# Patient Record
Sex: FEMALE | Race: WHITE | NOT HISPANIC OR LATINO | Employment: FULL TIME | URBAN - METROPOLITAN AREA
[De-identification: names, ages, dates, MRNs, and addresses within clinical notes are randomized per-mention and may not be internally consistent; named-entity substitution may affect disease eponyms.]

---

## 2020-10-20 ENCOUNTER — OFFICE VISIT (OUTPATIENT)
Dept: OBSTETRICS AND GYNECOLOGY | Facility: CLINIC | Age: 50
End: 2020-10-20
Payer: COMMERCIAL

## 2020-10-20 VITALS
DIASTOLIC BLOOD PRESSURE: 73 MMHG | HEIGHT: 61 IN | WEIGHT: 146 LBS | BODY MASS INDEX: 27.56 KG/M2 | SYSTOLIC BLOOD PRESSURE: 110 MMHG

## 2020-10-20 DIAGNOSIS — N95.1 MENOPAUSE SYNDROME: Primary | ICD-10-CM

## 2020-10-20 DIAGNOSIS — Z12.39 ENCOUNTER FOR SCREENING FOR MALIGNANT NEOPLASM OF BREAST, UNSPECIFIED SCREENING MODALITY: ICD-10-CM

## 2020-10-20 DIAGNOSIS — R10.2 PELVIC PAIN: ICD-10-CM

## 2020-10-20 PROCEDURE — 99396 PREV VISIT EST AGE 40-64: CPT | Mod: S$GLB,,, | Performed by: OBSTETRICS & GYNECOLOGY

## 2020-10-20 PROCEDURE — 3008F BODY MASS INDEX DOCD: CPT | Mod: CPTII,S$GLB,, | Performed by: OBSTETRICS & GYNECOLOGY

## 2020-10-20 PROCEDURE — 3008F PR BODY MASS INDEX (BMI) DOCUMENTED: ICD-10-PCS | Mod: CPTII,S$GLB,, | Performed by: OBSTETRICS & GYNECOLOGY

## 2020-10-20 PROCEDURE — 99396 PR PREVENTIVE VISIT,EST,40-64: ICD-10-PCS | Mod: S$GLB,,, | Performed by: OBSTETRICS & GYNECOLOGY

## 2020-10-20 RX ORDER — LEVOTHYROXINE SODIUM 100 UG/1
TABLET ORAL
COMMUNITY
Start: 2020-08-14 | End: 2021-12-17

## 2020-10-20 RX ORDER — SUMATRIPTAN SUCCINATE 100 MG/1
TABLET ORAL
COMMUNITY
Start: 2020-09-09 | End: 2021-12-17

## 2020-10-20 RX ORDER — NORETHINDRONE AND ETHINYL ESTRADIOL AND FERROUS FUMARATE 0.8-25(24)
1 KIT ORAL DAILY
COMMUNITY
Start: 2020-08-03 | End: 2020-10-20

## 2020-10-20 RX ORDER — HYDROCHLOROTHIAZIDE 25 MG/1
25 TABLET ORAL DAILY
COMMUNITY
Start: 2020-08-15 | End: 2023-09-01

## 2020-10-20 RX ORDER — DEXTROAMPHETAMINE SACCHARATE, AMPHETAMINE ASPARTATE, DEXTROAMPHETAMINE SULFATE AND AMPHETAMINE SULFATE 7.5; 7.5; 7.5; 7.5 MG/1; MG/1; MG/1; MG/1
TABLET ORAL
COMMUNITY
Start: 2020-09-09 | End: 2023-09-01

## 2020-10-20 NOTE — PROGRESS NOTES
Subjective:       Patient ID: Tereso Rogers is a 49 y.o. female.    Chief Complaint:  Well Woman      History of Present Illness  HPI  Annual Exam-Premenopausal  Patient presents for annual exam. The patient has no complaints today. The patient is sexually active. GYN screening history: last pap: was normal.                     GYN & OB History  No LMP recorded.   Date of Last Pap: No result found    OB History    Para Term  AB Living   3 3 3         SAB TAB Ectopic Multiple Live Births                  # Outcome Date GA Lbr Beni/2nd Weight Sex Delivery Anes PTL Lv   3 Term      Vag-Spont      2 Term      Vag-Spont      1 Term      Vag-Spont          Review of Systems  Review of Systems   Constitutional: Negative.  Negative for activity change, appetite change, chills, fatigue, fever and unexpected weight change.   HENT: Negative for nasal congestion.    Eyes: Negative for visual disturbance.   Respiratory: Negative for cough, shortness of breath and wheezing.    Cardiovascular: Negative for chest pain, palpitations and leg swelling.   Gastrointestinal: Negative.  Negative for abdominal pain, bloating, blood in stool, constipation, diarrhea and reflux.   Endocrine: Negative for diabetes, hair loss, hot flashes, hyperthyroidism and hypothyroidism.   Genitourinary: Positive for dyspareunia, hot flashes and pelvic pain. Negative for bladder incontinence, decreased libido, dysmenorrhea, dysuria, flank pain, frequency, genital sores, hematuria, menorrhagia, menstrual problem, urgency, vaginal bleeding, vaginal discharge, vaginal pain, urinary incontinence, postcoital bleeding, postmenopausal bleeding, vaginal dryness and vaginal odor.   Musculoskeletal: Negative for back pain, joint swelling and myalgias.   Integumentary:  Negative for rash, acne, hair changes, mole/lesion, breast mass, nipple discharge, breast skin changes and breast tenderness. Negative.   Neurological: Negative.  Negative for  vertigo, seizures, syncope, numbness and headaches.   Hematological: Negative.  Negative for adenopathy. Does not bruise/bleed easily.   Psychiatric/Behavioral: Negative.  Negative for depression and sleep disturbance. The patient is not nervous/anxious.    Breast: negative.  Negative for asymmetry, breast self exam, lump, mass, mastodynia, nipple discharge, skin changes and tenderness          Objective:    Physical Exam:   Constitutional: She appears well-developed and well-nourished.    HENT:   Nose: No epistaxis.     Neck: No thyroid mass and no thyromegaly present.    Cardiovascular: Normal rate, regular rhythm and normal pulses.     Pulmonary/Chest: Effort normal and breath sounds normal. Chest wall is not dull to percussion. She exhibits no mass, no tenderness, no bony tenderness, no laceration, no crepitus, no edema, no deformity, no swelling and no retraction. Right breast exhibits no inverted nipple, no mass, no nipple discharge, no skin change, no tenderness, presence, no bleeding and no swelling. Left breast exhibits no inverted nipple, no mass, no nipple discharge, no skin change, no tenderness, presence, no bleeding and no swelling.        Abdominal: Soft. Normal appearance and bowel sounds are normal. There is no abdominal tenderness. Hernia confirmed negative in the right inguinal area and confirmed negative in the left inguinal area.     Genitourinary:    Vagina normal.      Pelvic exam was performed with patient supine.   There is no rash, tenderness, lesion or injury on the right labia. There is no rash, tenderness, lesion or injury on the left labia. Uterus is tender. Cervix is normal. Right adnexum displays no mass, no tenderness and no fullness. Left adnexum displays no mass, no tenderness and no fullness. No rectocele, cystocele or unspecified prolapse of vaginal walls in the vagina. Labial bartholins normal.   Genitourinary Comments: Tender adnexa left                   Skin: Skin is warm and  dry.    Psychiatric: She has a normal mood and affect. Her speech is normal and behavior is normal.          Assessment:        1. Menopause syndrome    2. Pelvic pain    3. Encounter for screening for malignant neoplasm of breast, unspecified screening modality       Menopause syndrome  -     Follicle Stimulating Hormone; Future; Expected date: 10/20/2020  -     Testosterone; Future; Expected date: 10/20/2020    Pelvic pain  -     US OB/GYN Procedure ([facility]); Future    Encounter for screening for malignant neoplasm of breast, unspecified screening modality  -     Mammo Digital Screening Bilat w/ Hugo; Future; Expected date: 10/20/2020             Plan:      Follow up in about 1 year (around 10/20/2021).    t she had blood work drawn while she was on the pill which I explained is not accurate she has stopped it for 2 months therefore we will recheck her labs because she is having menopausal symptoms.  We also get an ultrasound to look at the cause of the pain on that side on the left

## 2020-10-22 ENCOUNTER — PATIENT MESSAGE (OUTPATIENT)
Dept: OBSTETRICS AND GYNECOLOGY | Facility: CLINIC | Age: 50
End: 2020-10-22

## 2020-10-22 ENCOUNTER — PROCEDURE VISIT (OUTPATIENT)
Dept: OBSTETRICS AND GYNECOLOGY | Facility: CLINIC | Age: 50
End: 2020-10-22
Payer: COMMERCIAL

## 2020-10-22 DIAGNOSIS — R10.2 PELVIC PAIN: ICD-10-CM

## 2020-10-22 PROCEDURE — 76830 PR  ECHOGRAPHY,TRANSVAGINAL: ICD-10-PCS | Mod: S$GLB,,, | Performed by: OBSTETRICS & GYNECOLOGY

## 2020-10-22 PROCEDURE — 76830 TRANSVAGINAL US NON-OB: CPT | Mod: S$GLB,,, | Performed by: OBSTETRICS & GYNECOLOGY

## 2020-10-22 NOTE — TELEPHONE ENCOUNTER
The patient and discussed her ultrasound results which showed that she had some fibroids and otherwise looked okay she does not have significant pain from this and does not desire any surgery or definitive treatment.  We will wait for her labs to see if she is currently menopausal

## 2020-11-03 ENCOUNTER — PATIENT MESSAGE (OUTPATIENT)
Dept: OBSTETRICS AND GYNECOLOGY | Facility: CLINIC | Age: 50
End: 2020-11-03

## 2020-11-03 DIAGNOSIS — N95.1 MENOPAUSE SYNDROME: Primary | ICD-10-CM

## 2020-11-04 ENCOUNTER — TELEPHONE (OUTPATIENT)
Dept: OBSTETRICS AND GYNECOLOGY | Facility: CLINIC | Age: 50
End: 2020-11-04

## 2020-11-04 RX ORDER — DROSPIRENONE AND ETHINYL ESTRADIOL 0.02-3(28)
1 KIT ORAL DAILY
Qty: 28 TABLET | Refills: 12 | Status: SHIPPED | OUTPATIENT
Start: 2020-11-04 | End: 2021-12-17

## 2020-11-04 NOTE — TELEPHONE ENCOUNTER
----- Message from Rasheeda Zaidi sent at 11/3/2020  2:31 PM CST -----  Regarding: test results  Contact: Pt  Type:  Test Results    Who Called: Tereso Rogers  Name of Test (Lab/Mammo/Etc): lab   Date of Test: 10/20/20  Ordering Provider: Solo  Where the test was performed:  [facility]   Would the patient rather a call back or a response via [facility]? Call back   Best Call Back Number: [phone]  Additional Information:

## 2021-01-19 ENCOUNTER — PATIENT MESSAGE (OUTPATIENT)
Dept: OBSTETRICS AND GYNECOLOGY | Facility: CLINIC | Age: 51
End: 2021-01-19

## 2021-06-29 ENCOUNTER — TELEPHONE (OUTPATIENT)
Dept: OBSTETRICS AND GYNECOLOGY | Facility: CLINIC | Age: 51
End: 2021-06-29

## 2021-06-29 ENCOUNTER — PATIENT MESSAGE (OUTPATIENT)
Dept: OBSTETRICS AND GYNECOLOGY | Facility: CLINIC | Age: 51
End: 2021-06-29

## 2021-07-20 ENCOUNTER — PATIENT MESSAGE (OUTPATIENT)
Dept: OBSTETRICS AND GYNECOLOGY | Facility: CLINIC | Age: 51
End: 2021-07-20

## 2021-07-23 ENCOUNTER — TELEPHONE (OUTPATIENT)
Dept: OBSTETRICS AND GYNECOLOGY | Facility: CLINIC | Age: 51
End: 2021-07-23

## 2021-12-17 ENCOUNTER — OFFICE VISIT (OUTPATIENT)
Dept: OBSTETRICS AND GYNECOLOGY | Facility: CLINIC | Age: 51
End: 2021-12-17
Payer: COMMERCIAL

## 2021-12-17 VITALS
DIASTOLIC BLOOD PRESSURE: 90 MMHG | BODY MASS INDEX: 27.19 KG/M2 | SYSTOLIC BLOOD PRESSURE: 125 MMHG | HEIGHT: 61 IN | WEIGHT: 144 LBS

## 2021-12-17 DIAGNOSIS — Z30.9 ENCOUNTER FOR CONTRACEPTIVE MANAGEMENT, UNSPECIFIED TYPE: ICD-10-CM

## 2021-12-17 DIAGNOSIS — N95.1 MENOPAUSE SYNDROME: ICD-10-CM

## 2021-12-17 DIAGNOSIS — Z12.39 ENCOUNTER FOR SCREENING FOR MALIGNANT NEOPLASM OF BREAST, UNSPECIFIED SCREENING MODALITY: ICD-10-CM

## 2021-12-17 DIAGNOSIS — Z00.00 ENCOUNTER FOR WELLNESS EXAMINATION: Primary | ICD-10-CM

## 2021-12-17 PROCEDURE — 99396 PR PREVENTIVE VISIT,EST,40-64: ICD-10-PCS | Mod: S$GLB,,, | Performed by: OBSTETRICS & GYNECOLOGY

## 2021-12-17 PROCEDURE — 99396 PREV VISIT EST AGE 40-64: CPT | Mod: S$GLB,,, | Performed by: OBSTETRICS & GYNECOLOGY

## 2021-12-17 RX ORDER — LEVOTHYROXINE SODIUM 112 UG/1
TABLET ORAL
COMMUNITY
Start: 2021-11-26

## 2021-12-17 RX ORDER — DROSPIRENONE AND ETHINYL ESTRADIOL 0.02-3(28)
1 KIT ORAL DAILY
Qty: 84 TABLET | Refills: 3 | Status: SHIPPED | OUTPATIENT
Start: 2021-12-17 | End: 2022-12-23

## 2022-01-25 ENCOUNTER — PATIENT MESSAGE (OUTPATIENT)
Dept: OBSTETRICS AND GYNECOLOGY | Facility: CLINIC | Age: 52
End: 2022-01-25
Payer: COMMERCIAL

## 2022-07-14 ENCOUNTER — TELEPHONE (OUTPATIENT)
Dept: OBSTETRICS AND GYNECOLOGY | Facility: CLINIC | Age: 52
End: 2022-07-14
Payer: COMMERCIAL

## 2022-07-14 NOTE — TELEPHONE ENCOUNTER
----- Message from Kenia Mares sent at 7/14/2022 11:26 AM CDT -----  Contact: [facility]- radiology      Who Called: [facility]- radiology      Does the patient know what this is regarding?: need update mammo order/ pt scheduled 07/15/2022- 720 am     Would the patient rather a call back or a response via [facility]?  Callback   Best Call Back Number: [phone]    Additional Information: fax # [phone]

## 2022-07-21 ENCOUNTER — PATIENT MESSAGE (OUTPATIENT)
Dept: OBSTETRICS AND GYNECOLOGY | Facility: CLINIC | Age: 52
End: 2022-07-21
Payer: COMMERCIAL

## 2022-12-22 ENCOUNTER — TELEPHONE (OUTPATIENT)
Dept: OBSTETRICS AND GYNECOLOGY | Facility: CLINIC | Age: 52
End: 2022-12-22
Payer: COMMERCIAL

## 2022-12-22 NOTE — TELEPHONE ENCOUNTER
Spoke with pt. She is going to keep appt for tomorrow. If she needs to cancel in the morning she will call.

## 2022-12-22 NOTE — TELEPHONE ENCOUNTER
----- Message from Katerina Sultana sent at 12/22/2022  2:05 PM CST -----  Contact: self  Patient called about her appointment for tomorrow please call [phone]

## 2022-12-23 ENCOUNTER — PATIENT MESSAGE (OUTPATIENT)
Dept: OBSTETRICS AND GYNECOLOGY | Facility: CLINIC | Age: 52
End: 2022-12-23

## 2022-12-23 ENCOUNTER — OFFICE VISIT (OUTPATIENT)
Dept: OBSTETRICS AND GYNECOLOGY | Facility: CLINIC | Age: 52
End: 2022-12-23
Payer: COMMERCIAL

## 2022-12-23 VITALS
DIASTOLIC BLOOD PRESSURE: 93 MMHG | HEART RATE: 87 BPM | SYSTOLIC BLOOD PRESSURE: 129 MMHG | BODY MASS INDEX: 27.56 KG/M2 | HEIGHT: 61 IN | WEIGHT: 146 LBS

## 2022-12-23 DIAGNOSIS — Z13.820 SCREENING FOR OSTEOPOROSIS: ICD-10-CM

## 2022-12-23 DIAGNOSIS — N95.1 MENOPAUSE SYNDROME: ICD-10-CM

## 2022-12-23 DIAGNOSIS — Z00.00 BLOOD TESTS FOR ROUTINE GENERAL PHYSICAL EXAMINATION: ICD-10-CM

## 2022-12-23 DIAGNOSIS — Z12.31 ENCOUNTER FOR SCREENING MAMMOGRAM FOR MALIGNANT NEOPLASM OF BREAST: ICD-10-CM

## 2022-12-23 DIAGNOSIS — N95.1 MENOPAUSE SYNDROME: Primary | ICD-10-CM

## 2022-12-23 DIAGNOSIS — Z01.419 GYNECOLOGIC EXAM NORMAL: Primary | ICD-10-CM

## 2022-12-23 LAB
ABS NRBC COUNT: 0 X 10 3/UL (ref 0–0.01)
ABSOLUTE BASOPHIL: 0.06 X 10 3/UL (ref 0–0.22)
ABSOLUTE EOSINOPHIL: 0.18 X 10 3/UL (ref 0.04–0.54)
ABSOLUTE IMMATURE GRAN: 0.02 X 10 3/UL (ref 0–0.04)
ABSOLUTE LYMPHOCYTE: 3.18 X 10 3/UL (ref 0.86–4.75)
ABSOLUTE MONOCYTE: 0.71 X 10 3/UL (ref 0.22–1.08)
ALBUMIN SERPL-MCNC: 4.4 G/DL (ref 3.5–5.2)
ALBUMIN/GLOB SERPL ELPH: 1.5 {RATIO} (ref 1–2.7)
ALP ISOS SERPL LEV INH-CCNC: 77 U/L (ref 35–105)
ALT (SGPT): 17 U/L (ref 0–33)
ANION GAP SERPL CALC-SCNC: 13 MMOL/L (ref 8–17)
AST SERPL-CCNC: 16 U/L (ref 0–32)
BASOPHILS NFR BLD: 0.8 % (ref 0.2–1.2)
BILIRUBIN, TOTAL: 0.48 MG/DL (ref 0–1.2)
BUN/CREAT SERPL: 20.7 (ref 6–20)
CALCIUM SERPL-MCNC: 10.2 MG/DL (ref 8.6–10.2)
CARBON DIOXIDE, CO2: 27 MMOL/L (ref 22–29)
CHLORIDE: 103 MMOL/L (ref 98–107)
CHOLEST SERPL-MSCNC: 226 MG/DL (ref 100–200)
CREAT SERPL-MCNC: 0.92 MG/DL (ref 0.5–0.9)
EOSINOPHIL NFR BLD: 2.3 % (ref 0.7–7)
FSH: 79.4 MIU/ML
GFR ESTIMATION: 75.86
GLOBULIN: 3 G/DL (ref 1.5–4.5)
GLUCOSE: 99 MG/DL (ref 74–106)
HCT VFR BLD AUTO: 47.9 % (ref 37–47)
HDLC SERPL-MCNC: 70 MG/DL
HGB BLD-MCNC: 16.3 G/DL (ref 12–16)
IMMATURE GRANULOCYTES: 0.3 % (ref 0–0.5)
LDL/HDL RATIO: 2 (ref 1–3)
LDLC SERPL CALC-MCNC: 137.8 MG/DL (ref 0–100)
LYMPHOCYTES NFR BLD: 41 % (ref 19.3–53.1)
MCH RBC QN AUTO: 30 PG (ref 27–32)
MCHC RBC AUTO-ENTMCNC: 34 G/DL (ref 32–36)
MCV RBC AUTO: 88.2 FL (ref 82–100)
MONOCYTES NFR BLD: 9.1 % (ref 4.7–12.5)
NEUTROPHILS # BLD AUTO: 3.61 X 10 3/UL (ref 2.15–7.56)
NEUTROPHILS NFR BLD: 46.5 % (ref 34–71.1)
NUCLEATED RED BLOOD CELLS: 0 /100 WBC (ref 0–0.2)
PLATELET # BLD AUTO: 312 X 10 3/UL (ref 135–400)
POTASSIUM: 3.2 MMOL/L (ref 3.5–5.1)
PROT SNV-MCNC: 7.4 G/DL (ref 6.4–8.3)
RBC # BLD AUTO: 5.43 X 10 6/UL (ref 4.2–5.4)
RDW-SD: 41.3 FL (ref 37–54)
SODIUM: 143 MMOL/L (ref 136–145)
TRIGL SERPL-MCNC: 91 MG/DL (ref 0–150)
TSH SERPL DL<=0.005 MIU/L-ACNC: 0.57 UIU/ML (ref 0.27–4.2)
UREA NITROGEN (BUN): 19 MG/DL (ref 6–20)
WBC # BLD: 7.76 X 10 3/UL (ref 4.3–10.8)

## 2022-12-23 PROCEDURE — 99396 PREV VISIT EST AGE 40-64: CPT | Mod: S$GLB,,, | Performed by: NURSE PRACTITIONER

## 2022-12-23 PROCEDURE — 99396 PR PREVENTIVE VISIT,EST,40-64: ICD-10-PCS | Mod: S$GLB,,, | Performed by: NURSE PRACTITIONER

## 2022-12-23 RX ORDER — TOPIRAMATE 100 MG/1
100 TABLET, FILM COATED ORAL NIGHTLY
COMMUNITY
Start: 2022-11-28

## 2022-12-23 RX ORDER — ESTERIFIED ESTROGEN AND METHYLTESTOSTERONE 1.25; 2.5 MG/1; MG/1
1 TABLET ORAL DAILY
Qty: 30 TABLET | Refills: 5 | Status: SHIPPED | OUTPATIENT
Start: 2022-12-23 | End: 2023-09-01

## 2022-12-23 RX ORDER — PROGESTERONE 200 MG/1
200 CAPSULE ORAL NIGHTLY
Qty: 30 CAPSULE | Refills: 11 | Status: SHIPPED | OUTPATIENT
Start: 2022-12-23 | End: 2023-09-01

## 2022-12-23 NOTE — PROGRESS NOTES
"  Subjective:       Patient ID: Tereso Mills is a 52 y.o. female.    Chief Complaint:  Well Woman      History of Present Illness  HPI  Annual Exam-Premenopausal  Patient presents for annual exam. The patient has no complaints today. The patient is sexually active. GYN screening history: last pap: was normal and last mammogram: was normal. The patient wears seatbelts: yes.     Reports that she has been off of the pill for a week and would like to check her FSH and wellness labs     Outpatient Medications Marked as Taking for the 22 encounter (Office Visit) with Sharifa Guillermo NP   Medication Sig Dispense Refill    dextroamphetamine-amphetamine 30 mg Tab       drospirenone-ethinyl estradioL (HIREN) 3-0.02 mg per tablet Take 1 tablet by mouth once daily. 84 tablet 3    hydroCHLOROthiazide (HYDRODIURIL) 25 MG tablet Take 25 mg by mouth once daily.      SYNTHROID 112 mcg tablet       topiramate (TOPAMAX) 100 MG tablet Take 100 mg by mouth every evening.       Vitals:    22 0837   BP: (!) 129/93   Pulse: 87   Weight: 66.2 kg (146 lb)   Height: 5' 1" (1.549 m)     Past Medical History:   Diagnosis Date    Hashimoto's disease     Hashimoto's disease     Hypothyroid      Past Surgical History:   Procedure Laterality Date    AUGMENTATION OF BREAST      DILATION AND CURETTAGE OF UTERUS      HYSTEROSCOPY      UTERINE ABLATION           GYN & OB History  No LMP recorded. Patient has had an ablation.   Date of Last Pap: No result found    OB History    Para Term  AB Living   3 3 3 0 0     SAB IAB Ectopic Multiple Live Births   0 0 0          # Outcome Date GA Lbr Beni/2nd Weight Sex Delivery Anes PTL Lv   3 Term      Vag-Spont      2 Term      Vag-Spont      1 Term      Vag-Spont          Review of Systems  Review of Systems   Constitutional:  Negative for activity change, appetite change, chills, fatigue and fever.   HENT:  Negative for nasal congestion and tinnitus.    Eyes:  Negative for visual " disturbance.   Respiratory:  Negative for cough and shortness of breath.    Cardiovascular:  Negative for chest pain and palpitations.   Gastrointestinal:  Negative for abdominal pain, bloating, blood in stool, constipation, nausea and vomiting.   Endocrine: Negative for diabetes, hair loss and hot flashes.   Genitourinary:  Negative for bladder incontinence, decreased libido, dysmenorrhea, dyspareunia, dysuria, flank pain, frequency, genital sores, hematuria, hot flashes, menorrhagia, menstrual problem, pelvic pain, urgency, vaginal bleeding, vaginal discharge, vaginal pain, urinary incontinence, postcoital bleeding, postmenopausal bleeding, vaginal dryness and vaginal odor.   Musculoskeletal:  Negative for arthralgias, back pain, leg pain and myalgias.   Integumentary:  Negative for rash, acne, hair changes, mole/lesion, breast mass, nipple discharge, breast skin changes and breast tenderness.   Neurological:  Negative for vertigo, syncope, numbness and headaches.   Hematological:  Does not bruise/bleed easily.   Psychiatric/Behavioral:  Negative for depression and sleep disturbance. The patient is not nervous/anxious.    Breast: Negative for asymmetry, lump, mass, mastodynia, nipple discharge, skin changes and tenderness        Objective:    Physical Exam:   Constitutional: Vital signs are normal. She appears well-developed and well-nourished.    HENT:   Head: Normocephalic.   Nose: No epistaxis.    Eyes: Lids are normal.    Neck: Trachea normal.    Cardiovascular:  Normal rate, regular rhythm and normal heart sounds.             Pulmonary/Chest: Effort normal and breath sounds normal. Right breast exhibits no mass, no skin change, no tenderness and no swelling. Left breast exhibits no mass, no skin change, no tenderness and no swelling. Breasts are symmetrical.          Genitourinary:    Vagina, uterus and rectum normal.      Pelvic exam was performed with patient supine.   Labial bartholins normal.Cervix is  normal. Right adnexum displays no mass and no tenderness. Left adnexum displays no mass and no tenderness. No erythema or  no vaginal discharge in the vagina.              Lymphadenopathy:     She has no cervical adenopathy.      Psychiatric: She has a normal mood and affect. Her speech is normal and behavior is normal. Judgment and thought content normal.        Assessment:        1. Gynecologic exam normal    2. Menopause syndrome    3. Encounter for screening mammogram for malignant neoplasm of breast    4. Screening for osteoporosis    5. Blood tests for routine general physical examination                Plan:      Gynecologic exam normal  -     Liquid-based pap smear, screening    Menopause syndrome  -     Follicle Stimulating Hormone; Future; Expected date: 12/23/2022    Encounter for screening mammogram for malignant neoplasm of breast  -     Mammo Digital Screening Bilat w/ Hugo; Future; Expected date: 12/23/2022    Screening for osteoporosis  -     DXA Bone Density Spine And Hip; Future; Expected date: 12/23/2022    Blood tests for routine general physical examination  -     CBC Auto Differential; Future; Expected date: 12/23/2022  -     Comprehensive Metabolic Panel; Future; Expected date: 12/23/2022  -     TSH; Future; Expected date: 12/23/2022  -     Lipid Panel; Future; Expected date: 12/23/2022      We will check labs    Patient was counseled today on current ASCCP pap guidelines, the recommendation for yearly pelvic exams, healthy diet and exercise routines, annual mammograms starting at age 40, and breast self awareness. She is to see her PCP for other health maintenance.     Follow up in about 1 year (around 12/23/2023).

## 2022-12-29 ENCOUNTER — PATIENT MESSAGE (OUTPATIENT)
Dept: OBSTETRICS AND GYNECOLOGY | Facility: CLINIC | Age: 52
End: 2022-12-29
Payer: COMMERCIAL

## 2022-12-29 LAB — Lab: NORMAL

## 2023-02-16 ENCOUNTER — PATIENT MESSAGE (OUTPATIENT)
Dept: OBSTETRICS AND GYNECOLOGY | Facility: CLINIC | Age: 53
End: 2023-02-16
Payer: COMMERCIAL

## 2023-02-16 NOTE — TELEPHONE ENCOUNTER
She can try the half strength if she wants. The other option is to just do estrogen wo the testosterone

## 2023-06-16 ENCOUNTER — PATIENT MESSAGE (OUTPATIENT)
Dept: OBSTETRICS AND GYNECOLOGY | Facility: CLINIC | Age: 53
End: 2023-06-16
Payer: COMMERCIAL

## 2023-09-01 ENCOUNTER — OFFICE VISIT (OUTPATIENT)
Dept: OBSTETRICS AND GYNECOLOGY | Facility: CLINIC | Age: 53
End: 2023-09-01
Payer: COMMERCIAL

## 2023-09-01 VITALS
DIASTOLIC BLOOD PRESSURE: 91 MMHG | HEART RATE: 76 BPM | HEIGHT: 61 IN | BODY MASS INDEX: 27.64 KG/M2 | WEIGHT: 146.38 LBS | SYSTOLIC BLOOD PRESSURE: 135 MMHG

## 2023-09-01 DIAGNOSIS — Z12.31 BREAST CANCER SCREENING BY MAMMOGRAM: ICD-10-CM

## 2023-09-01 DIAGNOSIS — Z78.0 MENOPAUSE: Primary | ICD-10-CM

## 2023-09-01 PROCEDURE — 99396 PREV VISIT EST AGE 40-64: CPT | Mod: S$GLB,,, | Performed by: OBSTETRICS & GYNECOLOGY

## 2023-09-01 PROCEDURE — 99396 PR PREVENTIVE VISIT,EST,40-64: ICD-10-PCS | Mod: S$GLB,,, | Performed by: OBSTETRICS & GYNECOLOGY

## 2023-09-01 RX ORDER — DEXTROAMPHETAMINE SACCHARATE, AMPHETAMINE ASPARTATE MONOHYDRATE, DEXTROAMPHETAMINE SULFATE AND AMPHETAMINE SULFATE 7.5; 7.5; 7.5; 7.5 MG/1; MG/1; MG/1; MG/1
30 CAPSULE, EXTENDED RELEASE ORAL EVERY MORNING
COMMUNITY

## 2023-09-01 NOTE — PROGRESS NOTES
Answers submitted by the patient for this visit:  Gynecologic Exam Questionnaire  (Submitted on 2023)  Chief Complaint: Gynecologic exam  genital itching: No  genital lesions: No  genital odor: No  genital rash: No  missed menses: No  pelvic pain: No  vaginal bleeding: No  vaginal discharge: No  Chronicity: new  Pregnant now?: No  abdominal pain: No  anorexia: No  back pain: No  chills: No  constipation: No  diarrhea: No  discolored urine: No  dysuria: No  fever: No  flank pain: No  frequency: No  headaches: Yes  hematuria: No  nausea: No  painful intercourse: No  rash: No  urgency: No  vomiting: No  Sexual activity: sexually active  Partner with STD symptoms: no  Birth control: nothing  STD: No  abdominal surgery: No   section: No  Ectopic pregnancy: No  Endometriosis: No  herpes simplex: No  gynecological surgery: No  menorrhagia: No  metrorrhagia: No  miscarriage: No  ovarian cysts: No  perineal abscess: No  PID: No  terminated pregnancy: No  vaginosis: No

## 2023-09-01 NOTE — PROGRESS NOTES
Subjective:      Patient ID: Tereso Rogers is a 52 y.o. female.    Chief Complaint:  new patient (Patient has concerns with medications, menopause?, side effects of medications)      History of Present Illness  Gynecologic Exam  The patient's pertinent negatives include no genital itching, genital lesions, genital odor, genital rash, missed menses, pelvic pain, vaginal bleeding or vaginal discharge. This is a new problem. She is not pregnant. Associated symptoms include headaches. Pertinent negatives include no abdominal pain, anorexia, back pain, chills, constipation, diarrhea, discolored urine, dysuria, fever, flank pain, frequency, hematuria, nausea, painful intercourse, rash, urgency or vomiting. She is sexually active. No, her partner does not have an STD. She uses nothing for contraception. There is no history of an abdominal surgery, a  section, an ectopic pregnancy, endometriosis, a gynecological surgery, herpes simplex, menorrhagia, metrorrhagia, miscarriage, ovarian cysts, perineal abscess, PID, an STD, a terminated pregnancy or vaginosis.     Patient has other medications due to Hashimoto's  Was breaking out and swelling up and having difficulty concentration  Sex drive was gone  Was starting to gain weight.   Was suggested that patient take a lower does.    GYN & OB History  No LMP recorded (lmp unknown). Patient has had an ablation.   Date of Last Pap: No result found    OB History    Para Term  AB Living   3 3 3 0 0     SAB IAB Ectopic Multiple Live Births   0 0 0          # Outcome Date GA Lbr Beni/2nd Weight Sex Delivery Anes PTL Lv   3 Term      Vag-Spont      2 Term      Vag-Spont      1 Term      Vag-Spont          Review of Systems  Review of Systems   Constitutional:  Negative for chills and fever.   Gastrointestinal:  Negative for abdominal pain, anorexia, constipation, diarrhea, nausea and vomiting.   Genitourinary:  Negative for dysuria, flank pain, frequency,  hematuria, menorrhagia, missed menses, pelvic pain, urgency and vaginal discharge.   Musculoskeletal:  Negative for back pain.   Integumentary:  Negative for rash.   Neurological:  Positive for headaches.          Objective:     Physical Exam:   Constitutional: She is oriented to person, place, and time. Vital signs are normal. She appears well-developed and well-nourished. She is cooperative.      Neck: No thyroid mass and no thyromegaly present.    Cardiovascular:  Normal rate and normal pulses.             Pulmonary/Chest: Effort normal. No respiratory distress.        Abdominal: Soft. She exhibits no distension. There is no abdominal tenderness. No hernia.             Musculoskeletal: Moves all extremeties.       Neurological: She is alert and oriented to person, place, and time.    Skin: Skin is warm and dry. No rash noted.    Psychiatric: She has a normal mood and affect. Her speech is normal and behavior is normal. Judgment and thought content normal.         Assessment:     1. Menopause               Plan:     Primary Concerns are difficulty losing weight. Patient with h/o vasectomy and ablation.   Feels like libido is not decreased, however  may it is decreased.  also felt patient was having mood changes, he felt there was a slight improvement.  Explained patient may be perimenopausal rather than menopausal, hence the sx with medication.  Recommend obesity code

## 2023-09-09 LAB
E2: 82.3 PG/ML
FSH: 75.4 MIU/ML
TESTOST SERPL-MCNC: 19.9 NG/DL (ref 2.9–40.8)

## 2023-10-30 ENCOUNTER — PATIENT MESSAGE (OUTPATIENT)
Dept: OBSTETRICS AND GYNECOLOGY | Facility: CLINIC | Age: 53
End: 2023-10-30
Payer: COMMERCIAL

## 2023-11-22 ENCOUNTER — PATIENT MESSAGE (OUTPATIENT)
Dept: OBSTETRICS AND GYNECOLOGY | Facility: CLINIC | Age: 53
End: 2023-11-22
Payer: COMMERCIAL

## 2023-12-20 ENCOUNTER — PATIENT MESSAGE (OUTPATIENT)
Dept: OBSTETRICS AND GYNECOLOGY | Facility: CLINIC | Age: 53
End: 2023-12-20
Payer: COMMERCIAL